# Patient Record
Sex: MALE | Race: BLACK OR AFRICAN AMERICAN | NOT HISPANIC OR LATINO | URBAN - METROPOLITAN AREA
[De-identification: names, ages, dates, MRNs, and addresses within clinical notes are randomized per-mention and may not be internally consistent; named-entity substitution may affect disease eponyms.]

---

## 2025-06-23 ENCOUNTER — OFFICE VISIT (OUTPATIENT)
Dept: URGENT CARE | Age: 40
End: 2025-06-23
Payer: COMMERCIAL

## 2025-06-23 ENCOUNTER — ANCILLARY PROCEDURE (OUTPATIENT)
Dept: URGENT CARE | Age: 40
End: 2025-06-23
Payer: COMMERCIAL

## 2025-06-23 VITALS
TEMPERATURE: 98 F | BODY MASS INDEX: 36.57 KG/M2 | OXYGEN SATURATION: 95 % | HEIGHT: 72 IN | RESPIRATION RATE: 18 BRPM | DIASTOLIC BLOOD PRESSURE: 67 MMHG | HEART RATE: 105 BPM | SYSTOLIC BLOOD PRESSURE: 103 MMHG | WEIGHT: 270 LBS

## 2025-06-23 DIAGNOSIS — S99.921A INJURY OF RIGHT FOOT, INITIAL ENCOUNTER: Primary | ICD-10-CM

## 2025-06-23 DIAGNOSIS — Z87.09 HISTORY OF ASTHMA: ICD-10-CM

## 2025-06-23 DIAGNOSIS — S99.921A INJURY OF RIGHT FOOT, INITIAL ENCOUNTER: ICD-10-CM

## 2025-06-23 DIAGNOSIS — T07.XXXA ABRASIONS OF MULTIPLE SITES: ICD-10-CM

## 2025-06-23 PROCEDURE — 73630 X-RAY EXAM OF FOOT: CPT | Mod: RIGHT SIDE

## 2025-06-23 RX ORDER — IPRATROPIUM BROMIDE AND ALBUTEROL SULFATE 2.5; .5 MG/3ML; MG/3ML
3 SOLUTION RESPIRATORY (INHALATION) 4 TIMES DAILY
COMMUNITY

## 2025-06-23 RX ORDER — ALBUTEROL SULFATE 90 UG/1
2 INHALANT RESPIRATORY (INHALATION) EVERY 4 HOURS PRN
COMMUNITY
Start: 2023-09-07

## 2025-06-23 RX ORDER — ALBUTEROL SULFATE 90 UG/1
2 INHALANT RESPIRATORY (INHALATION) EVERY 6 HOURS PRN
Qty: 18 G | Refills: 0 | Status: SHIPPED | OUTPATIENT
Start: 2025-06-23 | End: 2026-06-23

## 2025-06-23 NOTE — LETTER
June 23, 2025     Patient: Ran De La Paz   YOB: 1985   Date of Visit: 6/23/2025       To Whom It May Concern:    Ran De La Paz was seen in my clinic on 6/23/2025 at 7:00 pm. Please excuse Ran for his absence from work on this day to make the appointment.  Patient may return to work on 6/26/2025.   If you have any questions or concerns, please don't hesitate to call.         Sincerely,         Arlene Yoder PA-C        CC: No Recipients

## 2025-06-23 NOTE — PROGRESS NOTES
Subjective   Patient ID: Ran De La Paz is a 39 y.o. male. They present today with a chief complaint of Fall (Fall at pool/Scrapped both legs on concrete/Bilateral feet injury/Right side hurts more).    HISTORY OF PRESENT ILLNESS:    - Presents to clinic s/p mechanical trip and fall while poolside yesterday. Tripped over goggles at about 7 PM.  - Sustained multiple superficial abrasions to the shins and the top of the L foot  - Reports twisting the right ankle/foot in the process; now reports pain with weight-bearing on the R side, as well as tenderness over the medial R foot  - Tdap UTD as of 2019  - Requests work excuse for 3 days due to pain/difficulty with ambulation  - States he has an ACE bandage at home  - Additionally requests a refill on his albuterol inhaler prescription (hx of asthma and has run out)    Past Medical History  Allergies as of 06/23/2025 - Reviewed 06/23/2025   Allergen Reaction Noted    Shellfish derived Unknown 06/23/2025    Sulfa (sulfonamide antibiotics) Unknown 08/27/2021       Current Outpatient Medications   Medication Instructions    albuterol 90 mcg/actuation inhaler 2 puffs, Every 4 hours PRN    albuterol 90 mcg/actuation inhaler 2 puffs, inhalation, Every 6 hours PRN    ipratropium-albuteroL (Duo-Neb) 0.5-2.5 mg/3 mL nebulizer solution 3 mL, 4 times daily         Medical History[1]    Surgical History[2]     reports that he has never smoked. He has never used smokeless tobacco.    Review of Systems    Review of Systems   Musculoskeletal:  Positive for arthralgias.        R foot   Skin:  Positive for wound.        Abrasions, b/l lower extremities   Neurological:  Negative for weakness and numbness.                                 Objective    Vitals:    06/23/25 1921   BP: 103/67   BP Location: Right arm   Patient Position: Sitting   BP Cuff Size: Large adult   Pulse: 105   Resp: 18   Temp: 36.7 °C (98 °F)   TempSrc: Oral   SpO2: 95%   Weight: 122 kg (270 lb)   Height: 1.829 m (6')      No LMP for male patient.  PHYSICAL EXAMINATION:    Physical Exam  Vitals and nursing note reviewed.   Constitutional:       General: He is not in acute distress.     Appearance: Normal appearance. He is not ill-appearing.   HENT:      Head: Normocephalic and atraumatic.      Nose: Nose normal.   Eyes:      General:         Right eye: No discharge.         Left eye: No discharge.      Extraocular Movements: Extraocular movements intact.      Conjunctiva/sclera: Conjunctivae normal.   Cardiovascular:      Rate and Rhythm: Normal rate.   Pulmonary:      Effort: Pulmonary effort is normal. No respiratory distress.   Musculoskeletal:      Cervical back: Normal range of motion and neck supple.      Right ankle: No deformity. No tenderness. Normal range of motion.      Right foot: Normal range of motion. Tenderness present. No deformity or laceration.        Feet:    Skin:     General: Skin is warm and dry.      Findings: Wound (several non-bleeding/non-draining abrasions noted over anterior aspect of b/l lower legs, and dorsum of the L foot) present.      Comments: No contamination or soiling noted.   Neurological:      General: No focal deficit present.      Mental Status: He is alert and oriented to person, place, and time.   Psychiatric:         Mood and Affect: Mood normal.         Behavior: Behavior normal.          Procedures    No results found for this visit on 06/23/25.    === 06/23/25 ===    XR FOOT 3+ VIEWS RIGHT    - Impression -  No acute osseous abnormality.      MACRO:  None    Signed by: Josh Miguel 6/23/2025 8:24 PM  Dictation workstation:   [id number]     Diagnostic study results (if any) were reviewed by Arlene Yoder PA-C.    Assessment/Plan   Allergies, medications, history, and pertinent labs/EKGs/Imaging reviewed by Arlene Yoder PA-C.     Orders and Diagnoses  Diagnoses and all orders for this visit:  Injury of right foot, initial encounter  -     XR foot right 3+ views; Future  -      Crutches  Abrasions of multiple sites  History of asthma  -     albuterol 90 mcg/actuation inhaler; Inhale 2 puffs every 6 hours if needed for wheezing or shortness of breath.      Medical Admin Record    Given overall well appearance, vital signs, history, and exam as above, there is no indication for further emergent testing/intervention at this time.      I discussed with the patient my clinical thoughts at this time given the above and we had a shared decision-making conversation in a patient-centered decision-making model on how to proceed forward. Pt was instructed on the importance of close follow-up. They were told that an urgent care diagnosis is often a preliminary impression and that definitive care is often not able to be given in the urgent care setting. Pt was educated that close follow-up is essential for good health and good outcomes. Patient was provided with the following instructions:         RICE. Crutches to assist ambulation as needed.     Tylenol or ibuprofen for pain as needed.      Keep wounds clean and bandaged. May apply bacitracin 1-2 times daily for next couple of days.     Follow up with PCP or orthopedics in the next 7-10 days if sx fail to show adequate improvement.        Clinical impression as well as limitations of available testing/examination, all discussed with patient. Pt is well at this time in the urgent care. They are comfortable with the present assessment and plan to be discharged home. Discussed with them close outpatient follow up, reassessment, and possible further testing/treatment via their PCP/specialist if symptoms fail to improve; they agree, understand, and are comfortable with this plan. Pt given the opportunity to ask questions prior to discharge and all questions were answered at this time. Via our discussion, the patient was advised of warning signs and instructions were reviewed. Strict ED precautions were given, acknowledged, and understood. Discussed with  the patient/family that it is okay to return to the urgent care at any time for anything. Advised to present to the ED if present condition changes/worsens or if they develop new symptoms at any time after discharge. Also, advised to go to the ED if they cannot establish outpatient follow-up in time frame specified above. Pt verbalized understanding and agreement with plan and instructions. Discussed the need for close follow up with their primary care provider and/or specialist for further testing/treatment/care/consultation in the short time frame as noted above, if needed - they understand these instructions and agree to close follow up for these reasons. Patient discharged home in stable condition.      Follow Up Instructions  No follow-ups on file.    Patient disposition: Home    Electronically signed by Arlene Yoder PA-C  12:14 PM         [1] History reviewed. No pertinent past medical history.  [2] History reviewed. No pertinent surgical history.

## 2025-06-24 ASSESSMENT — ENCOUNTER SYMPTOMS
NUMBNESS: 0
WOUND: 1
WEAKNESS: 0
ARTHRALGIAS: 1